# Patient Record
Sex: FEMALE | Race: WHITE | NOT HISPANIC OR LATINO
[De-identification: names, ages, dates, MRNs, and addresses within clinical notes are randomized per-mention and may not be internally consistent; named-entity substitution may affect disease eponyms.]

---

## 2017-05-26 ENCOUNTER — APPOINTMENT (OUTPATIENT)
Dept: ENDOCRINOLOGY | Facility: CLINIC | Age: 77
End: 2017-05-26

## 2017-05-26 VITALS — HEIGHT: 63 IN | WEIGHT: 184 LBS | BODY MASS INDEX: 32.6 KG/M2

## 2017-05-26 VITALS — SYSTOLIC BLOOD PRESSURE: 160 MMHG | OXYGEN SATURATION: 98 % | HEART RATE: 90 BPM | DIASTOLIC BLOOD PRESSURE: 72 MMHG

## 2017-05-26 RX ORDER — VERAPAMIL HYDROCHLORIDE 240 MG/1
240 CAPSULE, DELAYED RELEASE PELLETS ORAL
Refills: 0 | Status: ACTIVE | COMMUNITY

## 2017-05-30 LAB
25(OH)D3 SERPL-MCNC: 24 NG/ML
ALBUMIN SERPL ELPH-MCNC: 4.2 G/DL
ALP BLD-CCNC: 100 U/L
ALT SERPL-CCNC: 20 U/L
ANION GAP SERPL CALC-SCNC: 16 MMOL/L
AST SERPL-CCNC: 22 U/L
BILIRUB SERPL-MCNC: 0.2 MG/DL
BUN SERPL-MCNC: 16 MG/DL
CALCIUM SERPL-MCNC: 10.5 MG/DL
CALCIUM SERPL-MCNC: 10.6 MG/DL
CHLORIDE SERPL-SCNC: 106 MMOL/L
CO2 SERPL-SCNC: 22 MMOL/L
CREAT SERPL-MCNC: 0.8 MG/DL
GLUCOSE SERPL-MCNC: 86 MG/DL
MAGNESIUM SERPL-MCNC: 2.1 MG/DL
PARATHYROID HORMONE INTACT: 214 PG/ML
PHOSPHATE SERPL-MCNC: 3.6 MG/DL
POTASSIUM SERPL-SCNC: 5 MMOL/L
PROT SERPL-MCNC: 6.9 G/DL
SODIUM SERPL-SCNC: 144 MMOL/L
TSH SERPL-ACNC: 1.5 UIU/ML

## 2017-12-19 ENCOUNTER — APPOINTMENT (OUTPATIENT)
Dept: ENDOCRINOLOGY | Facility: CLINIC | Age: 77
End: 2017-12-19
Payer: MEDICARE

## 2017-12-19 VITALS
HEIGHT: 63 IN | SYSTOLIC BLOOD PRESSURE: 152 MMHG | WEIGHT: 181 LBS | BODY MASS INDEX: 32.07 KG/M2 | HEART RATE: 92 BPM | DIASTOLIC BLOOD PRESSURE: 92 MMHG | OXYGEN SATURATION: 98 %

## 2017-12-19 PROCEDURE — 99214 OFFICE O/P EST MOD 30 MIN: CPT | Mod: 25

## 2017-12-19 RX ORDER — VALSARTAN 160 MG/1
160 TABLET, COATED ORAL
Qty: 180 | Refills: 0 | Status: COMPLETED | COMMUNITY
Start: 2017-08-10

## 2017-12-19 RX ORDER — LEVOFLOXACIN 250 MG/1
250 TABLET, FILM COATED ORAL
Qty: 2 | Refills: 0 | Status: COMPLETED | COMMUNITY
Start: 2017-09-14

## 2017-12-19 RX ORDER — VERAPAMIL HYDROCHLORIDE 120 MG/1
120 TABLET ORAL
Qty: 90 | Refills: 0 | Status: COMPLETED | COMMUNITY
Start: 2017-06-30

## 2017-12-19 RX ORDER — UMECLIDINIUM BROMIDE AND VILANTEROL TRIFENATATE 62.5; 25 UG/1; UG/1
62.5-25 POWDER RESPIRATORY (INHALATION)
Qty: 60 | Refills: 0 | Status: COMPLETED | COMMUNITY
Start: 2017-01-18

## 2017-12-19 RX ORDER — ALPRAZOLAM 0.25 MG/1
0.25 TABLET ORAL
Qty: 10 | Refills: 0 | Status: COMPLETED | COMMUNITY
Start: 2017-09-08

## 2017-12-19 RX ORDER — METOPROLOL SUCCINATE 50 MG/1
50 TABLET, EXTENDED RELEASE ORAL
Qty: 45 | Refills: 0 | Status: COMPLETED | COMMUNITY
Start: 2017-10-25

## 2017-12-19 RX ORDER — LEVOCETIRIZINE DIHYDROCHLORIDE 5 MG/1
5 TABLET ORAL
Qty: 30 | Refills: 0 | Status: COMPLETED | COMMUNITY
Start: 2017-04-28

## 2017-12-20 LAB
24R-OH-CALCIDIOL SERPL-MCNC: 95.6 PG/ML
25(OH)D3 SERPL-MCNC: 32.6 NG/ML
ALBUMIN SERPL ELPH-MCNC: 4.4 G/DL
ALP BLD-CCNC: 117 U/L
ALT SERPL-CCNC: 30 U/L
ANION GAP SERPL CALC-SCNC: 11 MMOL/L
AST SERPL-CCNC: 29 U/L
BILIRUB SERPL-MCNC: 0.3 MG/DL
BUN SERPL-MCNC: 15 MG/DL
CALCIUM SERPL-MCNC: 12.1 MG/DL
CALCIUM SERPL-MCNC: 12.1 MG/DL
CHLORIDE SERPL-SCNC: 102 MMOL/L
CO2 SERPL-SCNC: 25 MMOL/L
CREAT SERPL-MCNC: 0.76 MG/DL
GLUCOSE SERPL-MCNC: 101 MG/DL
PARATHYROID HORMONE INTACT: 268 PG/ML
PHOSPHATE SERPL-MCNC: 2.4 MG/DL
POTASSIUM SERPL-SCNC: 4.8 MMOL/L
PROT SERPL-MCNC: 7 G/DL
SODIUM SERPL-SCNC: 138 MMOL/L

## 2018-06-19 ENCOUNTER — APPOINTMENT (OUTPATIENT)
Dept: ENDOCRINOLOGY | Facility: CLINIC | Age: 78
End: 2018-06-19
Payer: MEDICARE

## 2018-06-19 VITALS — HEIGHT: 63 IN | BODY MASS INDEX: 30.83 KG/M2 | WEIGHT: 174 LBS

## 2018-06-19 VITALS — HEART RATE: 92 BPM | OXYGEN SATURATION: 95 % | SYSTOLIC BLOOD PRESSURE: 140 MMHG | DIASTOLIC BLOOD PRESSURE: 100 MMHG

## 2018-06-19 DIAGNOSIS — E78.5 HYPERLIPIDEMIA, UNSPECIFIED: ICD-10-CM

## 2018-06-19 PROCEDURE — ZZZZZ: CPT

## 2018-06-19 PROCEDURE — 99214 OFFICE O/P EST MOD 30 MIN: CPT | Mod: 25

## 2018-06-19 PROCEDURE — 77080 DXA BONE DENSITY AXIAL: CPT | Mod: GA

## 2018-06-19 RX ORDER — PNV NO.95/FERROUS FUM/FOLIC AC 28MG-0.8MG
TABLET ORAL
Refills: 0 | Status: DISCONTINUED | COMMUNITY
End: 2018-06-19

## 2018-06-19 RX ORDER — RISEDRONATE SODIUM 150 MG/1
150 TABLET, FILM COATED ORAL
Qty: 3 | Refills: 3 | Status: DISCONTINUED | COMMUNITY
Start: 2017-05-26 | End: 2018-06-19

## 2018-06-19 RX ORDER — BIOTIN 10 MG
TABLET ORAL
Refills: 0 | Status: DISCONTINUED | COMMUNITY
End: 2018-06-19

## 2018-06-20 LAB
24R-OH-CALCIDIOL SERPL-MCNC: 77.9 PG/ML
25(OH)D3 SERPL-MCNC: 24.2 NG/ML
ALBUMIN SERPL ELPH-MCNC: 4.5 G/DL
ALP BLD-CCNC: 105 U/L
ALT SERPL-CCNC: 19 U/L
ANION GAP SERPL CALC-SCNC: 14 MMOL/L
AST SERPL-CCNC: 23 U/L
BILIRUB SERPL-MCNC: 0.3 MG/DL
BUN SERPL-MCNC: 15 MG/DL
CALCIUM SERPL-MCNC: 11.3 MG/DL
CALCIUM SERPL-MCNC: 11.3 MG/DL
CHLORIDE SERPL-SCNC: 104 MMOL/L
CHOLEST SERPL-MCNC: 177 MG/DL
CHOLEST/HDLC SERPL: 2.3 RATIO
CO2 SERPL-SCNC: 24 MMOL/L
CREAT SERPL-MCNC: 0.74 MG/DL
GLUCOSE SERPL-MCNC: 113 MG/DL
HDLC SERPL-MCNC: 76 MG/DL
LDLC SERPL CALC-MCNC: 82 MG/DL
PARATHYROID HORMONE INTACT: 242 PG/ML
PHOSPHATE SERPL-MCNC: 2.6 MG/DL
POTASSIUM SERPL-SCNC: 5.4 MMOL/L
PROT SERPL-MCNC: 7 G/DL
SODIUM SERPL-SCNC: 142 MMOL/L
TRIGL SERPL-MCNC: 94 MG/DL

## 2018-06-21 PROBLEM — E78.5 HYPERLIPIDEMIA: Status: ACTIVE | Noted: 2018-06-19

## 2019-04-08 ENCOUNTER — APPOINTMENT (OUTPATIENT)
Dept: ENDOCRINOLOGY | Facility: CLINIC | Age: 79
End: 2019-04-08
Payer: MEDICARE

## 2019-04-08 VITALS
OXYGEN SATURATION: 94 % | SYSTOLIC BLOOD PRESSURE: 164 MMHG | HEIGHT: 63 IN | HEART RATE: 85 BPM | BODY MASS INDEX: 33.49 KG/M2 | WEIGHT: 189 LBS | DIASTOLIC BLOOD PRESSURE: 84 MMHG

## 2019-04-08 DIAGNOSIS — E83.52 HYPERCALCEMIA: ICD-10-CM

## 2019-04-08 PROCEDURE — 99214 OFFICE O/P EST MOD 30 MIN: CPT

## 2019-04-08 NOTE — PHYSICAL EXAM
[Alert] : alert [No Acute Distress] : no acute distress [Well Nourished] : well nourished [Well Developed] : well developed [Normal Sclera/Conjunctiva] : normal sclera/conjunctiva [No Proptosis] : no proptosis [Normal Oropharynx] : the oropharynx was normal [Thyroid Not Enlarged] : the thyroid was not enlarged [No Thyroid Nodules] : there were no palpable thyroid nodules [No Respiratory Distress] : no respiratory distress [No Accessory Muscle Use] : no accessory muscle use [Clear to Auscultation] : lungs were clear to auscultation bilaterally [Normal Rate] : heart rate was normal  [Normal S1, S2] : normal S1 and S2 [Regular Rhythm] : with a regular rhythm [Pedal Pulses Normal] : the pedal pulses are present [No Edema] : there was no peripheral edema [Normal Bowel Sounds] : normal bowel sounds [Not Tender] : non-tender [Soft] : abdomen soft [Not Distended] : not distended [Post Cervical Nodes] : posterior cervical nodes [Anterior Cervical Nodes] : anterior cervical nodes [Axillary Nodes] : axillary nodes [Normal] : normal and non tender [No Spinal Tenderness] : no spinal tenderness [Spine Straight] : spine straight [No Stigmata of Cushings Syndrome] : no stigmata of cushings syndrome [Normal Gait] : normal gait [Normal Strength/Tone] : muscle strength and tone were normal [No Rash] : no rash [Normal Reflexes] : deep tendon reflexes were 2+ and symmetric [No Motor Deficits] : the motor exam was normal [No Tremors] : no tremors [Oriented x3] : oriented to person, place, and time [Normal Affect] : the affect was normal [Normal Mood] : the mood was normal [Scoliosis] : scoliosis present

## 2019-04-08 NOTE — HISTORY OF PRESENT ILLNESS
[Risedronate (Actonel)] : Risedronate [Vitamin D (supplements)] : Vitamin D as a dietary supplement [Hyperparathyroidism] : hyperparathyroidism [FreeTextEntry1] : f/u 78 year-old female with primary hyperparathyroidism. \par \par Dx ~2013. Her initial presentation was remarkable for Ca levels >10.5 with PTH  207. She has had no kidney stones or fx.  BMD May 2015 compatible with osteopenia. Repeat calcium was 10.5 with  in May 2015. BMD 5/2017  decreased hip but increased radius. Began Actonel but stopped due to UGI sx. BMD 2018 decreased in proximal radius.  No new drugs such as lithium or HCTZ. She has no fhx of Ca disorders. No fractures or falls. Pt denying osteoporosis rx or parathyroid surgery consult. \par \par Dx with 70% carotid artery stenosis and was placed on lipitor by her PCP. \par \par Routine cystoscopy because she previously had a polyp. No compliations.  [Disordered Eating] : no past or present history of disordered eating [Taking Steroids] : no past or present history of taking steroids [Kidney Stones] : no history of kidney stones [Excessive Alcohol Intake] : no past or present history of excessive alcohol intake [Family History of Osteoporosis] : no family history of osteoporosis [Family History of Hip Fracture] : no family history of hip fracture [de-identified] : Previous smoker quit 12 years ago

## 2019-04-08 NOTE — REVIEW OF SYSTEMS
[Diarrhea] : diarrhea [Joint Stiffness] : joint stiffness [Negative] : Heme/Lymph [FreeTextEntry5] : HLD, HTN [FreeTextEntry7] : GI evaluation for diarrhea has been unremarkable [FreeTextEntry9] : Arthritic pain typically in the morning in bilat knees and hands that resolves as the day progresses

## 2019-04-08 NOTE — END OF VISIT
[FreeTextEntry3] : I, Ivory Rizvi, authored this note working as a medical scribe for Dr. Kaba.  04/08/2019. 11:15AM. \par This note was authored by Ivory Rizvi working as medical scribe for me. I have reviewed, edited, and revised the note as needed. I am in agreement with the exam findings, imaging findings, and treatment plan.  Boaz Kaba MD

## 2019-04-08 NOTE — ASSESSMENT
[FreeTextEntry1] : 78 year-old female with osteopenia in the setting of PHPT. \par \par Pt remains asymptomatic; no h/o fx or kidney stones.Prior Ca was 12.1 recommended surgical consult, did not pursue. BMD 2017 showed decrease in hips.  Pt tried  Actonel but stopped due to UGI sx. BMD 2018 decreased further in proximal radius. Sites still consistent with osteopenia. Options discussed in great detail, jocelyn Reclast or Prolia. Pt states she was told by Dr. Howard Muñiz to not start osteoporosis rx. Pt chose not to start other osteoporosis rx. Option of surgery vs monitoring for PHPT again reviewed. Pt not planning on surgery. \par \par Pt states Ca was ~ 11 on last labs. I request labs sent out today to include PTH and Vit D. \par \par f/u in 6 mons. \par Jeanmarie ANTONIETA, Cassy ML, Bruno R, et al. Guidelines for the management of asymptomatic primary hyperparathyroidism: summary statement from the [employer] Workshop. J Clin Endocrinol Metab 2014; 99:3561

## 2019-04-09 LAB
24R-OH-CALCIDIOL SERPL-MCNC: 90.4 PG/ML
25(OH)D3 SERPL-MCNC: 24.5 NG/ML
ALBUMIN SERPL ELPH-MCNC: 4.3 G/DL
ALP BLD-CCNC: 101 U/L
ALT SERPL-CCNC: 14 U/L
ANION GAP SERPL CALC-SCNC: 10 MMOL/L
AST SERPL-CCNC: 18 U/L
BILIRUB SERPL-MCNC: 0.2 MG/DL
BUN SERPL-MCNC: 17 MG/DL
CALCIUM SERPL-MCNC: 11.4 MG/DL
CALCIUM SERPL-MCNC: 11.4 MG/DL
CHLORIDE SERPL-SCNC: 106 MMOL/L
CO2 SERPL-SCNC: 23 MMOL/L
CREAT SERPL-MCNC: 0.69 MG/DL
GLUCOSE SERPL-MCNC: 84 MG/DL
MAGNESIUM SERPL-MCNC: 2 MG/DL
PARATHYROID HORMONE INTACT: 234 PG/ML
PHOSPHATE SERPL-MCNC: 2.4 MG/DL
POTASSIUM SERPL-SCNC: 4.7 MMOL/L
PROT SERPL-MCNC: 6.9 G/DL
SODIUM SERPL-SCNC: 139 MMOL/L

## 2019-04-17 ENCOUNTER — APPOINTMENT (OUTPATIENT)
Dept: SURGERY | Facility: CLINIC | Age: 79
End: 2019-04-17

## 2019-08-28 ENCOUNTER — APPOINTMENT (OUTPATIENT)
Dept: SURGICAL ONCOLOGY | Facility: CLINIC | Age: 79
End: 2019-08-28
Payer: MEDICARE

## 2019-08-28 VITALS
HEART RATE: 77 BPM | HEIGHT: 63 IN | WEIGHT: 189 LBS | SYSTOLIC BLOOD PRESSURE: 174 MMHG | DIASTOLIC BLOOD PRESSURE: 83 MMHG | BODY MASS INDEX: 33.49 KG/M2 | OXYGEN SATURATION: 97 %

## 2019-08-28 DIAGNOSIS — R06.9 UNSPECIFIED ABNORMALITIES OF BREATHING: ICD-10-CM

## 2019-08-28 DIAGNOSIS — I51.9 HEART DISEASE, UNSPECIFIED: ICD-10-CM

## 2019-08-28 DIAGNOSIS — Z86.79 PERSONAL HISTORY OF OTHER DISEASES OF THE CIRCULATORY SYSTEM: ICD-10-CM

## 2019-08-28 DIAGNOSIS — Z86.39 PERSONAL HISTORY OF OTHER ENDOCRINE, NUTRITIONAL AND METABOLIC DISEASE: ICD-10-CM

## 2019-08-28 DIAGNOSIS — Z87.09 PERSONAL HISTORY OF OTHER DISEASES OF THE RESPIRATORY SYSTEM: ICD-10-CM

## 2019-08-28 PROCEDURE — 99205 OFFICE O/P NEW HI 60 MIN: CPT

## 2019-08-28 NOTE — HISTORY OF PRESENT ILLNESS
[de-identified] : 79 y/o female patient presents for initial consultation for right buttock abscess she has noticed since April.\par She reports that she has had a bump there on her right buttock for fifty years.\par She has one day left of current one week course of Doxycycline. \par \par Right buttock culture: Moderate growth of Staphylococcus aureus (8/19/19).\par \par Allergic to Bactrim.\par Denies constitutional symptoms. \par Denies fever or chills.

## 2019-08-28 NOTE — PHYSICAL EXAM
[Normal] : supple, no neck mass and thyroid not enlarged [Normal Neck Lymph Nodes] : normal neck lymph nodes  [Normal Supraclavicular Lymph Nodes] : normal supraclavicular lymph nodes [Normal Groin Lymph Nodes] : normal groin lymph nodes [Normal Axillary Lymph Nodes] : normal axillary lymph nodes [Normal] : grossly intact [de-identified] : 6x8cm right lateral buttock mass with overlying skin discoloration draining purulent/keratinaceous fluid from two sites. No significant skin erythema.

## 2019-08-28 NOTE — OB HISTORY
[Menarche Age ____] : menarche age [unfilled] [Postmenopausal] : The patient is postmenopausal [unknown] : the patient is unsure of the date of her LMP [Live Births ___] : P[unfilled]  [Total Preg ___] : G[unfilled]

## 2019-08-28 NOTE — ADDENDUM
[FreeTextEntry1] : I, Zofia Bass, acted solely as a scribe for Dr. Vu Fontana on this date 08/28/2019.\par

## 2019-08-28 NOTE — CONSULT LETTER
[Dear  ___] : Dear  [unfilled], [Consult Letter:] : I had the pleasure of evaluating your patient, [unfilled]. [Please see my note below.] : Please see my note below. [Sincerely,] : Sincerely, [FreeTextEntry3] : Vu Fontana MD FACS\par

## 2019-08-28 NOTE — ASSESSMENT
[FreeTextEntry1] : Suspect right lateral buttock sebaceous cyst.\par Likely infected\par No obvious fluctuant abscess that needs immediate drainage\par Will continue oral abx\par Will schedule for elective surgical resection\par Procedure discussed in detail.\par All questions answered.

## 2019-08-29 RX ORDER — DOXYCYCLINE HYCLATE 100 MG/1
100 TABLET ORAL
Qty: 28 | Refills: 0 | Status: ACTIVE | COMMUNITY
Start: 2019-08-29 | End: 1900-01-01

## 2019-09-03 ENCOUNTER — RX RENEWAL (OUTPATIENT)
Age: 79
End: 2019-09-03

## 2019-09-05 ENCOUNTER — OUTPATIENT (OUTPATIENT)
Dept: OUTPATIENT SERVICES | Facility: HOSPITAL | Age: 79
LOS: 1 days | End: 2019-09-05
Payer: MEDICARE

## 2019-09-05 VITALS
TEMPERATURE: 99 F | RESPIRATION RATE: 16 BRPM | SYSTOLIC BLOOD PRESSURE: 140 MMHG | HEIGHT: 63 IN | OXYGEN SATURATION: 98 % | WEIGHT: 182.98 LBS | HEART RATE: 76 BPM | DIASTOLIC BLOOD PRESSURE: 74 MMHG

## 2019-09-05 DIAGNOSIS — D41.4 NEOPLASM OF UNCERTAIN BEHAVIOR OF BLADDER: Chronic | ICD-10-CM

## 2019-09-05 DIAGNOSIS — L02.31 CUTANEOUS ABSCESS OF BUTTOCK: ICD-10-CM

## 2019-09-05 DIAGNOSIS — Z98.890 OTHER SPECIFIED POSTPROCEDURAL STATES: Chronic | ICD-10-CM

## 2019-09-05 LAB
ANION GAP SERPL CALC-SCNC: 10 MMO/L — SIGNIFICANT CHANGE UP (ref 7–14)
BASOPHILS # BLD AUTO: 0.06 K/UL — SIGNIFICANT CHANGE UP (ref 0–0.2)
BASOPHILS NFR BLD AUTO: 0.8 % — SIGNIFICANT CHANGE UP (ref 0–2)
BLD GP AB SCN SERPL QL: NEGATIVE — SIGNIFICANT CHANGE UP
BUN SERPL-MCNC: 13 MG/DL — SIGNIFICANT CHANGE UP (ref 7–23)
CALCIUM SERPL-MCNC: 11.8 MG/DL — HIGH (ref 8.4–10.5)
CHLORIDE SERPL-SCNC: 104 MMOL/L — SIGNIFICANT CHANGE UP (ref 98–107)
CO2 SERPL-SCNC: 25 MMOL/L — SIGNIFICANT CHANGE UP (ref 22–31)
CREAT SERPL-MCNC: 0.69 MG/DL — SIGNIFICANT CHANGE UP (ref 0.5–1.3)
EOSINOPHIL # BLD AUTO: 0.22 K/UL — SIGNIFICANT CHANGE UP (ref 0–0.5)
EOSINOPHIL NFR BLD AUTO: 3.1 % — SIGNIFICANT CHANGE UP (ref 0–6)
GLUCOSE SERPL-MCNC: 78 MG/DL — SIGNIFICANT CHANGE UP (ref 70–99)
HCT VFR BLD CALC: 38 % — SIGNIFICANT CHANGE UP (ref 34.5–45)
HGB BLD-MCNC: 11.6 G/DL — SIGNIFICANT CHANGE UP (ref 11.5–15.5)
IMM GRANULOCYTES NFR BLD AUTO: 0.3 % — SIGNIFICANT CHANGE UP (ref 0–1.5)
LYMPHOCYTES # BLD AUTO: 1.46 K/UL — SIGNIFICANT CHANGE UP (ref 1–3.3)
LYMPHOCYTES # BLD AUTO: 20.2 % — SIGNIFICANT CHANGE UP (ref 13–44)
MCHC RBC-ENTMCNC: 28.2 PG — SIGNIFICANT CHANGE UP (ref 27–34)
MCHC RBC-ENTMCNC: 30.5 % — LOW (ref 32–36)
MCV RBC AUTO: 92.2 FL — SIGNIFICANT CHANGE UP (ref 80–100)
MONOCYTES # BLD AUTO: 0.79 K/UL — SIGNIFICANT CHANGE UP (ref 0–0.9)
MONOCYTES NFR BLD AUTO: 11 % — SIGNIFICANT CHANGE UP (ref 2–14)
NEUTROPHILS # BLD AUTO: 4.66 K/UL — SIGNIFICANT CHANGE UP (ref 1.8–7.4)
NEUTROPHILS NFR BLD AUTO: 64.6 % — SIGNIFICANT CHANGE UP (ref 43–77)
NRBC # FLD: 0 K/UL — SIGNIFICANT CHANGE UP (ref 0–0)
PLATELET # BLD AUTO: 267 K/UL — SIGNIFICANT CHANGE UP (ref 150–400)
PMV BLD: 10.4 FL — SIGNIFICANT CHANGE UP (ref 7–13)
POTASSIUM SERPL-MCNC: 4.6 MMOL/L — SIGNIFICANT CHANGE UP (ref 3.5–5.3)
POTASSIUM SERPL-SCNC: 4.6 MMOL/L — SIGNIFICANT CHANGE UP (ref 3.5–5.3)
RBC # BLD: 4.12 M/UL — SIGNIFICANT CHANGE UP (ref 3.8–5.2)
RBC # FLD: 13.4 % — SIGNIFICANT CHANGE UP (ref 10.3–14.5)
RH IG SCN BLD-IMP: POSITIVE — SIGNIFICANT CHANGE UP
SODIUM SERPL-SCNC: 139 MMOL/L — SIGNIFICANT CHANGE UP (ref 135–145)
WBC # BLD: 7.21 K/UL — SIGNIFICANT CHANGE UP (ref 3.8–10.5)
WBC # FLD AUTO: 7.21 K/UL — SIGNIFICANT CHANGE UP (ref 3.8–10.5)

## 2019-09-05 PROCEDURE — 93010 ELECTROCARDIOGRAM REPORT: CPT

## 2019-09-05 NOTE — H&P PST ADULT - RS GEN PE MLT RESP DETAILS PC
breath sounds equal/airway patent/no wheezes/clear to auscultation bilaterally/good air movement/no rales/respirations non-labored/no rhonchi

## 2019-09-05 NOTE — H&P PST ADULT - NEGATIVE ENMT SYMPTOMS
no ear pain/no nasal obstruction/no dry mouth/no tinnitus/no hearing difficulty/no throat pain/no dysphagia/no vertigo

## 2019-09-05 NOTE — H&P PST ADULT - NSICDXPASTMEDICALHX_GEN_ALL_CORE_FT
PAST MEDICAL HISTORY:  Abscess of right buttock     Arthritis spine and knee    Asthma with COPD emphysema    High cholesterol     HTN (hypertension)     Spinal stenosis PAST MEDICAL HISTORY:  Abscess of right buttock     Arthritis spine and knee    Asthma with COPD emphysema    Exertional dyspnea     H/O carotid stenosis <60%    H/O hyperparathyroidism     H/O pulmonary hypertension     High cholesterol     History of aortic insufficiency     HTN (hypertension)     Obesity     Spinal stenosis

## 2019-09-05 NOTE — H&P PST ADULT - NSICDXPROBLEM_GEN_ALL_CORE_FT
Problem: cutaneous abscess of buttock   Assessment and Plan: Patient scheduled for resection right buttock mass on 09/09/19    Patient provided with verbal and written presurgical instructions; verbalized understanding  with teach back.    Patient provided with famotidine for GI prophylaxis; verbalized understanding.    Patient provided with Chlorhexidine wash, verbal and written instructions reviewed. Patient demonstrated understanding with teach back.     OR booking notified of shellfish   Medical and Cardiac evaluation requested by surgeon and PST , for advanced COPD with pulmonary HTN and dyspnea, patient visited PCP/ Cardiologist  8/27/19, will obtain.   Recent stress and echo requested    Problem: Hypertension  Assessment and Plan: Patient instructed to take valsartan and verapamil on day of procedure, verbalized understanding.    Problem: COPD  Assessment and Plan: Patient instructed to take Xopenex trelegy,  on day of procedure, verbalized understanding. Problem: cutaneous abscess of buttock   Assessment and Plan: Patient scheduled for resection right buttock mass on 09/09/19    Patient provided with verbal and written presurgical instructions; verbalized understanding  with teach back.    Patient provided with famotidine for GI prophylaxis; verbalized understanding.    Patient provided with Chlorhexidine wash, verbal and written instructions reviewed. Patient demonstrated understanding with teach back.     OR booking notified of shellfish   Medical evaluation for advanced COPD and Cardiac evaluation requested by surgeon and PST for pulmonary HTN and dyspnea, will obtain.   Recent stress and echo requested    Problem: Hypertension  Assessment and Plan: Patient instructed to take valsartan and verapamil on day of procedure, verbalized understanding.    Problem: COPD  Assessment and Plan: Patient instructed to take Xopenex trelegy,  on day of procedure, verbalized understanding.

## 2019-09-05 NOTE — H&P PST ADULT - ASSESSMENT
cutaneous abscess of buttock scheduled for resection right buttock mass on 09/09/19 DX: cutaneous abscess of buttock

## 2019-09-05 NOTE — H&P PST ADULT - HISTORY OF PRESENT ILLNESS
78 year old female presents with recurrent abscess to right buttocks, reports multiple incision and drainage; last I&D 8/19/19 states it's still "draining and new bump developed". Presents to PST with preop diagnosis of cutaneous abscess of buttock scheduled for resection right buttock mass on 09/09/19. Patient states she stopped prescribed antibiotics (doxycycline) due to stomach upset and refused to take Biaxin prescribed. 78 year old female presents with recurrent abscess to right buttocks, reports multiple incision and drainage; last I&D 8/19/19 states it's still "draining and new bump developed". Presents to PST with preop diagnosis of cutaneous abscess of buttock scheduled for resection right buttock mass on 09/09/19. 78 year old female presents with recurrent abscess to right buttocks, reports multiple incision and drainage; last I&D 8/19/19 states it's still "draining and new bump developed", culture indicated Staphylococcus aureous, patient treated with doxycycline for 1 week. Presents to PST with preop diagnosis of cutaneous abscess of buttock scheduled for resection right buttock mass on 09/09/19.

## 2019-09-08 ENCOUNTER — TRANSCRIPTION ENCOUNTER (OUTPATIENT)
Age: 79
End: 2019-09-08

## 2019-09-09 ENCOUNTER — RESULT REVIEW (OUTPATIENT)
Age: 79
End: 2019-09-09

## 2019-09-09 ENCOUNTER — APPOINTMENT (OUTPATIENT)
Dept: SURGICAL ONCOLOGY | Facility: AMBULATORY SURGERY CENTER | Age: 79
End: 2019-09-09

## 2019-09-09 ENCOUNTER — OUTPATIENT (OUTPATIENT)
Dept: OUTPATIENT SERVICES | Facility: HOSPITAL | Age: 79
LOS: 1 days | Discharge: HOME | End: 2019-09-09
Payer: MEDICARE

## 2019-09-09 VITALS
RESPIRATION RATE: 16 BRPM | HEIGHT: 63 IN | TEMPERATURE: 98 F | SYSTOLIC BLOOD PRESSURE: 178 MMHG | OXYGEN SATURATION: 100 % | HEART RATE: 76 BPM | DIASTOLIC BLOOD PRESSURE: 58 MMHG | WEIGHT: 182.98 LBS

## 2019-09-09 VITALS
HEART RATE: 81 BPM | OXYGEN SATURATION: 100 % | DIASTOLIC BLOOD PRESSURE: 88 MMHG | RESPIRATION RATE: 15 BRPM | SYSTOLIC BLOOD PRESSURE: 128 MMHG

## 2019-09-09 DIAGNOSIS — D41.4 NEOPLASM OF UNCERTAIN BEHAVIOR OF BLADDER: Chronic | ICD-10-CM

## 2019-09-09 DIAGNOSIS — L02.31 CUTANEOUS ABSCESS OF BUTTOCK: ICD-10-CM

## 2019-09-09 DIAGNOSIS — Z98.890 OTHER SPECIFIED POSTPROCEDURAL STATES: Chronic | ICD-10-CM

## 2019-09-09 PROCEDURE — 14001 TIS TRNFR TRUNK 10.1-30SQCM: CPT | Mod: 59

## 2019-09-09 PROCEDURE — 88305 TISSUE EXAM BY PATHOLOGIST: CPT | Mod: 26

## 2019-09-09 PROCEDURE — 27059 RESECT HIP/PELV TUM 5 CM/>: CPT

## 2019-09-09 RX ORDER — ATORVASTATIN CALCIUM 80 MG/1
1 TABLET, FILM COATED ORAL
Qty: 0 | Refills: 0 | DISCHARGE

## 2019-09-09 RX ORDER — FLUTICASONE FUROATE, UMECLIDINIUM BROMIDE AND VILANTEROL TRIFENATATE 200; 62.5; 25 UG/1; UG/1; UG/1
1 POWDER RESPIRATORY (INHALATION)
Qty: 0 | Refills: 0 | DISCHARGE

## 2019-09-09 RX ORDER — VERAPAMIL HCL 240 MG
1 CAPSULE, EXTENDED RELEASE PELLETS 24 HR ORAL
Qty: 0 | Refills: 0 | DISCHARGE

## 2019-09-09 RX ORDER — VALSARTAN 80 MG/1
1 TABLET ORAL
Qty: 0 | Refills: 0 | DISCHARGE

## 2019-09-09 RX ORDER — FEXOFENADINE HCL 30 MG
1 TABLET ORAL
Qty: 0 | Refills: 0 | DISCHARGE

## 2019-09-09 RX ORDER — LEVALBUTEROL 1.25 MG/.5ML
3 SOLUTION, CONCENTRATE RESPIRATORY (INHALATION)
Qty: 0 | Refills: 0 | DISCHARGE

## 2019-09-09 RX ORDER — OXYCODONE HYDROCHLORIDE 5 MG/1
1 TABLET ORAL
Qty: 10 | Refills: 0
Start: 2019-09-09

## 2019-09-09 RX ORDER — ALUMINUM ZIRCONIUM TRICHLOROHYDREX GLY 0.2 G/G
1 STICK TOPICAL
Qty: 0 | Refills: 0 | DISCHARGE

## 2019-09-09 NOTE — ASU DISCHARGE PLAN (ADULT/PEDIATRIC) - CALL YOUR DOCTOR IF YOU HAVE ANY OF THE FOLLOWING:
Swelling that gets worse/Bleeding that does not stop/Wound/Surgical Site with redness, or foul smelling discharge or pus/Pain not relieved by Medications/Fever greater than (need to indicate Fahrenheit or Celsius) declines

## 2019-09-09 NOTE — ASU DISCHARGE PLAN (ADULT/PEDIATRIC) - CARE PROVIDER_API CALL
Vu Fontana)  Surgery  [address] [place] [address]  Phone: [phone]  Fax: [phone]  Follow Up Time: 2 weeks

## 2019-09-09 NOTE — ASU DISCHARGE PLAN (ADULT/PEDIATRIC) - ASU DC SPECIAL INSTRUCTIONSFT
WOUND CARE: Keep incision clean and dry.  Cover with dry, sterile dressing.  You may remove the top dressing 2 days after surgery. Underneath the top dressing there are white bandages (Steri Strips) directly adherent to your skin. Do not remove these Steri Strips; they will fall off on their own over the next week.   BATHING: Please do not submerge wound underwater. You may shower and/or sponge bathe.  ACTIVITY: No heavy lifting or straining. Otherwise, you may return to your usual level of physical activity. If you are taking narcotic pain medication (such as Percocet), do NOT drive a car, operate machinery or make important decisions.  NOTIFY YOUR SURGEON IF: You have any bleeding that does not stop, any pus draining from your wound, any fever (over 100.4 F) or chills, persistent nausea/vomiting, persistent diarrhea, or if your pain is not controlled on your discharge pain medications.  FOLLOW-UP:  1. Follow-up with Dr. Fontana within 1-2 weeks of discharge.  Please call office for appointment  2. Please follow up with your primary care physician in one week regarding your surgery.

## 2019-09-10 PROBLEM — Z86.79 PERSONAL HISTORY OF OTHER DISEASES OF THE CIRCULATORY SYSTEM: Chronic | Status: ACTIVE | Noted: 2019-09-05

## 2019-09-10 PROBLEM — E78.00 PURE HYPERCHOLESTEROLEMIA, UNSPECIFIED: Chronic | Status: ACTIVE | Noted: 2019-09-05

## 2019-09-10 PROBLEM — Z86.39 PERSONAL HISTORY OF OTHER ENDOCRINE, NUTRITIONAL AND METABOLIC DISEASE: Chronic | Status: ACTIVE | Noted: 2019-09-05

## 2019-09-10 PROBLEM — I10 ESSENTIAL (PRIMARY) HYPERTENSION: Chronic | Status: ACTIVE | Noted: 2019-09-05

## 2019-09-10 PROBLEM — M19.90 UNSPECIFIED OSTEOARTHRITIS, UNSPECIFIED SITE: Chronic | Status: ACTIVE | Noted: 2019-09-05

## 2019-09-10 PROBLEM — M48.00 SPINAL STENOSIS, SITE UNSPECIFIED: Chronic | Status: ACTIVE | Noted: 2019-09-05

## 2019-09-10 PROBLEM — R06.09 OTHER FORMS OF DYSPNEA: Chronic | Status: ACTIVE | Noted: 2019-09-05

## 2019-09-10 PROBLEM — J44.9 CHRONIC OBSTRUCTIVE PULMONARY DISEASE, UNSPECIFIED: Chronic | Status: ACTIVE | Noted: 2019-09-05

## 2019-09-10 PROBLEM — E66.9 OBESITY, UNSPECIFIED: Chronic | Status: ACTIVE | Noted: 2019-09-05

## 2019-09-10 PROBLEM — L02.31 CUTANEOUS ABSCESS OF BUTTOCK: Chronic | Status: ACTIVE | Noted: 2019-09-05

## 2019-09-25 ENCOUNTER — APPOINTMENT (OUTPATIENT)
Dept: SURGICAL ONCOLOGY | Facility: CLINIC | Age: 79
End: 2019-09-25
Payer: MEDICARE

## 2019-09-25 VITALS
HEIGHT: 63 IN | SYSTOLIC BLOOD PRESSURE: 200 MMHG | HEART RATE: 86 BPM | DIASTOLIC BLOOD PRESSURE: 98 MMHG | BODY MASS INDEX: 32.43 KG/M2 | OXYGEN SATURATION: 97 % | WEIGHT: 183 LBS

## 2019-09-25 DIAGNOSIS — L02.31 CUTANEOUS ABSCESS OF BUTTOCK: ICD-10-CM

## 2019-09-25 LAB — SURGICAL PATHOLOGY STUDY: SIGNIFICANT CHANGE UP

## 2019-09-25 PROCEDURE — 99024 POSTOP FOLLOW-UP VISIT: CPT

## 2019-09-25 NOTE — HISTORY OF PRESENT ILLNESS
[de-identified] : 78 y/o female patient presents for post-op follow-up s/p right lateral buttock mass resection on 9/9/19. Pathology: Squamous cell carcinoma, invasive, well differentiated. Tumor size: 4.5 x 3.9 x 2.9 cm (per gross description). Dermal fibrosis. The inked surgical margins appear clear of squamous cell carcinoma.\par \par She notes some soreness at surgical site.\par \par Right buttock culture: Moderate growth of Staphylococcus aureus (8/19/19).\par \par She has a right buttock abscess she has noticed since April.\par She reported that she has had a bump there on her right buttock for fifty years.\par She finished course of Doxycycline.\par \par Allergic to Bactrim.\par Denies constitutional symptoms. \par Denies fever or chills.

## 2019-09-25 NOTE — ASSESSMENT
[FreeTextEntry1] : S/p resection right buttock mass\par Final pathology revealed SCC\par Margins negative\par Normal post-operative course. \par RTO 3 months for physical examination\par \par Enclosed pathology report

## 2019-09-25 NOTE — CONSULT LETTER
[Dear  ___] : Dear  [unfilled], [Courtesy Letter:] : I had the pleasure of seeing your patient, [unfilled], in my office today. [Please see my note below.] : Please see my note below. [Sincerely,] : Sincerely, [FreeTextEntry3] : Vu Fontana MD FACS\par

## 2019-09-25 NOTE — PHYSICAL EXAM
----- Message from Shana Coleman MA sent at 4/22/2019  9:37 AM CDT -----  Contact: Anju @ [employer]      ----- Message -----  From: Ansley Olguin  Sent: 4/22/2019   9:06 AM  To: Ronak Christianson Staff    Anju would like a call back regarding the validity of an out of work letter regarding the pt    She would like a call back @ [phone] ext [phone]    Thank you   
Returned call and clarified that patient had gotten her letter early in anticipation of procedure on Wednesday and she turned it in early to her employer. They were just wanting to confirm the legitimacy of the letter.  
[de-identified] : Right buttock incision healing well. No evidence of infection.

## 2019-09-25 NOTE — OB HISTORY
[Postmenopausal] : The patient is postmenopausal [Menarche Age ____] : menarche age [unfilled] [unknown] : the patient is unsure of the date of her LMP [Total Preg ___] : G[unfilled] [Live Births ___] : P[unfilled]

## 2019-09-25 NOTE — ADDENDUM
[FreeTextEntry1] : I, Zofia Bass, acted solely as a scribe for Dr. Vu Fontana on this date 09/25/2019.

## 2019-10-04 ENCOUNTER — APPOINTMENT (OUTPATIENT)
Dept: ENDOCRINOLOGY | Facility: CLINIC | Age: 79
End: 2019-10-04

## 2019-10-18 ENCOUNTER — APPOINTMENT (OUTPATIENT)
Dept: ENDOCRINOLOGY | Facility: CLINIC | Age: 79
End: 2019-10-18
Payer: MEDICARE

## 2019-10-18 VITALS
BODY MASS INDEX: 32.6 KG/M2 | HEART RATE: 88 BPM | OXYGEN SATURATION: 98 % | WEIGHT: 184 LBS | HEIGHT: 63 IN | SYSTOLIC BLOOD PRESSURE: 144 MMHG | DIASTOLIC BLOOD PRESSURE: 80 MMHG

## 2019-10-18 DIAGNOSIS — M85.80 OTHER SPECIFIED DISORDERS OF BONE DENSITY AND STRUCTURE, UNSPECIFIED SITE: ICD-10-CM

## 2019-10-18 DIAGNOSIS — E21.0 PRIMARY HYPERPARATHYROIDISM: ICD-10-CM

## 2019-10-18 PROCEDURE — 77080 DXA BONE DENSITY AXIAL: CPT | Mod: GA

## 2019-10-18 PROCEDURE — 99214 OFFICE O/P EST MOD 30 MIN: CPT | Mod: 25

## 2019-10-18 PROCEDURE — ZZZZZ: CPT

## 2019-10-18 RX ORDER — FLUTICASONE FUROATE, UMECLIDINIUM BROMIDE AND VILANTEROL TRIFENATATE 200; 62.5; 25 UG/1; UG/1; UG/1
POWDER RESPIRATORY (INHALATION)
Refills: 0 | Status: ACTIVE | COMMUNITY

## 2019-10-18 RX ORDER — SULFAMETHOXAZOLE AND TRIMETHOPRIM 800; 160 MG/1; MG/1
800-160 TABLET ORAL
Qty: 20 | Refills: 0 | Status: DISCONTINUED | COMMUNITY
Start: 2019-09-03 | End: 2019-10-18

## 2019-10-18 RX ORDER — TIOTROPIUM BROMIDE AND OLODATEROL 3.124; 2.736 UG/1; UG/1
2.5-2.5 SPRAY, METERED RESPIRATORY (INHALATION)
Refills: 0 | Status: DISCONTINUED | COMMUNITY
End: 2019-10-18

## 2019-10-18 NOTE — HISTORY OF PRESENT ILLNESS
[Risedronate (Actonel)] : Risedronate [Vitamin D (supplements)] : Vitamin D as a dietary supplement [Hyperparathyroidism] : hyperparathyroidism [FreeTextEntry1] : f/u 79 year-old female with primary hyperparathyroidism. The patient was reluctant to consider parathyroid surgery in the past but is now planning on surgery with Dr. Nathan in 2020, waiting for son to have abdominal surgery.\par \par Dx ~2013. Her initial presentation was remarkable for Ca levels >10.5 with PTH  207. She has had no kidney stones or fx.  BMD May 2015 compatible with osteopenia. Repeat calcium was 10.5 with  in May 2015. BMD 5/2017  decreased hip but increased radius. Began Actonel but stopped due to UGI sx. BMD 2018 decreased in proximal radius.  No new drugs such as lithium or HCTZ. She has no fhx of Ca disorders. No fractures or falls. Pt denied osteoporosis rx because Dr. Muñiz said she does not meet the requirements to treat.\par \par Dx with 70% carotid artery stenosis and was placed on lipitor by her PCP. \par \par Pt had squamous cell carcinoma tumor removed 9/2019. Now resolved, clear margins. [Disordered Eating] : no past or present history of disordered eating [Taking Steroids] : no past or present history of taking steroids [Kidney Stones] : no history of kidney stones [Family History of Osteoporosis] : no family history of osteoporosis [Excessive Alcohol Intake] : no past or present history of excessive alcohol intake [Family History of Hip Fracture] : no family history of hip fracture [de-identified] : Previous smoker quit 12 years ago

## 2019-10-18 NOTE — END OF VISIT
[FreeTextEntry3] : I, Rm Baker, authored this note working as a medical scribe for Dr. Kaba.  10/18/2019. 11:30AM   This note was authored by the medical scribe for me. I have reviewed, edited, and revised the note as needed. I am in agreement with the exam findings, imaging findings, and treatment plan.  Boaz Kaba MD

## 2019-10-18 NOTE — PHYSICAL EXAM
[Alert] : alert [No Acute Distress] : no acute distress [Well Nourished] : well nourished [Well Developed] : well developed [Normal Sclera/Conjunctiva] : normal sclera/conjunctiva [No Proptosis] : no proptosis [No Thyroid Nodules] : there were no palpable thyroid nodules [Normal Oropharynx] : the oropharynx was normal [Thyroid Not Enlarged] : the thyroid was not enlarged [Clear to Auscultation] : lungs were clear to auscultation bilaterally [No Accessory Muscle Use] : no accessory muscle use [No Respiratory Distress] : no respiratory distress [Normal S1, S2] : normal S1 and S2 [Normal Rate] : heart rate was normal  [No Edema] : there was no peripheral edema [Pedal Pulses Normal] : the pedal pulses are present [Regular Rhythm] : with a regular rhythm [Not Tender] : non-tender [Normal Bowel Sounds] : normal bowel sounds [Soft] : abdomen soft [Post Cervical Nodes] : posterior cervical nodes [Not Distended] : not distended [Axillary Nodes] : axillary nodes [Anterior Cervical Nodes] : anterior cervical nodes [No Spinal Tenderness] : no spinal tenderness [Normal] : normal and non tender [Scoliosis] : scoliosis present [Spine Straight] : spine straight [No Stigmata of Cushings Syndrome] : no stigmata of cushings syndrome [Normal Strength/Tone] : muscle strength and tone were normal [No Rash] : no rash [Normal Gait] : normal gait [No Motor Deficits] : the motor exam was normal [Normal Reflexes] : deep tendon reflexes were 2+ and symmetric [No Tremors] : no tremors [Normal Affect] : the affect was normal [Oriented x3] : oriented to person, place, and time [Normal Mood] : the mood was normal

## 2019-10-18 NOTE — ASSESSMENT
[FreeTextEntry1] : 79 year-old female with PHPT. \par \par Pt remains asymptomatic; no h/o fx or kidney stones. Prior Ca was 12.1 recommended surgical consult, did not pursue. BMD 2017 showed decrease in hips.  Pt tried Actonel but stopped due to UGI sx. BMD 2018 decreased further in proximal radius. Sites still consistent with osteopenia. Pt states she was told by Dr. Howard Muñiz she does not need to start osteoporosis rx at this time. BMD 10/2019 indicates stable osteopenia in hip, proximal radius shows slightly worsened osteopenia.\par \par Option of PHPT surgery again reviewed. Pt planning on surgery after the holidays.\par 24 hr urine Ca 124. . Ca 11.4\par \par f/u in 6 months

## 2019-10-18 NOTE — PROCEDURE
[FreeTextEntry1] : Bone mineral density: 10/18/2019 \par Indication: vs. 2018 indication PHPT\par Spine: not performed\par Total hip: -2.1 osteopenia, no significant change \par Femoral neck: -2.3 osteopenia, no significant change \par Proximal radius: -2.0 osteopenia, -3.6%\par \par Bone mineral density 6/19/18\par indication: primary hyperparathyroidism \par spine not performed due to DJD \par total hip -1.9 osteopenia, no significant change \par femoral neck-2.2 osteopenia, no significant change \par proximal radius -1.7 osteopenia -5.7 %\par \par  Bone mineral density 5/26/17\par Indication primary hyperparathyroidism\par Spine not performed\par Total hip -2.1, osteopenia, --3.7%, a borderline statistical significance\par Femoral neck -2.3, osteopenia, -6.2%\par Proximal radius -1.1, normal, -11.8% versus 2006 seen but stable versus 2015\par \par Bone mineral density 5/16/16\par Indication primary hyperparathyroidism\par Spine not performed\par Total hip -1.8, osteopenia, -6.4% versus 2015\par Femoral neck -1.9, osteopenia, -4.5%\par Proximal radius 0.4, normal, +16.5%, no obvious source of error\par \par Repeat BMD- 05/11/2015\par indication primary hyperparathyroidism\par Spine falsely elevated due to arthritis\par Total Hip: -1.4, osteopenia, +3.1% versus 2014\par Fem Neck: -1.7, osteopenia, +8.5% versus 2014\par Prox Radius: -1.3, osteopenia, no significant change versus 2000 410\par \par bone mineral density test performed May 8, 2014\par Spine faulty elevated due to arthritis\par Total hip -1.6, osteopenia,\par Femoral neck -2.1, osteopenia\par Proximal radius -1.1, osteopenia

## 2019-10-18 NOTE — REVIEW OF SYSTEMS
[Joint Stiffness] : joint stiffness [Diarrhea] : diarrhea [Negative] : Heme/Lymph [FreeTextEntry5] : HLD, HTN [FreeTextEntry7] : GI evaluation for diarrhea has been unremarkable [FreeTextEntry9] : Arthritic pain typically in the morning in bilat knees and hands that resolves as the day progresses

## 2019-12-11 ENCOUNTER — APPOINTMENT (OUTPATIENT)
Dept: SURGICAL ONCOLOGY | Facility: CLINIC | Age: 79
End: 2019-12-11
Payer: MEDICARE

## 2019-12-11 VITALS
RESPIRATION RATE: 15 BRPM | BODY MASS INDEX: 32.78 KG/M2 | WEIGHT: 185 LBS | HEART RATE: 88 BPM | HEIGHT: 63 IN | SYSTOLIC BLOOD PRESSURE: 212 MMHG | DIASTOLIC BLOOD PRESSURE: 105 MMHG

## 2019-12-11 DIAGNOSIS — C44.529 SQUAMOUS CELL CARCINOMA OF SKIN OF OTHER PART OF TRUNK: ICD-10-CM

## 2019-12-11 PROCEDURE — 99214 OFFICE O/P EST MOD 30 MIN: CPT

## 2019-12-11 NOTE — OB HISTORY
[Menarche Age ____] : menarche age [unfilled] [Postmenopausal] : The patient is postmenopausal [Live Births ___] : P[unfilled]  [Total Preg ___] : G[unfilled] [unknown] : the patient is unsure of the date of her LMP

## 2019-12-23 NOTE — PHYSICAL EXAM
[Normal] : supple, no neck mass and thyroid not enlarged [Normal Neck Lymph Nodes] : normal neck lymph nodes  [Normal Groin Lymph Nodes] : normal groin lymph nodes [Normal Supraclavicular Lymph Nodes] : normal supraclavicular lymph nodes [Normal Axillary Lymph Nodes] : normal axillary lymph nodes [Normal] : oriented to person, place and time, with appropriate affect [de-identified] : Right buttock scar well healed. No other suspicious skin lesions.

## 2019-12-23 NOTE — ADDENDUM
[FreeTextEntry1] : I, Zofia Bass, acted solely as a scribe for Dr. Vu Fontana on this date 12/11/2019.

## 2019-12-23 NOTE — ASSESSMENT
[FreeTextEntry1] : S/p resection right buttock mass\par Final pathology revealed SCC\par Margins negative\par Normal physical examination \par Continue dermatologic surveillance with Dr. Richardson\par RTO 6 months

## 2019-12-23 NOTE — HISTORY OF PRESENT ILLNESS
[de-identified] : 80 y/o female patient presents for post-op follow-up s/p right lateral buttock mass resection on 9/9/19. Pathology: Squamous cell carcinoma, invasive, well differentiated. Tumor size: 4.5 x 3.9 x 2.9 cm (per gross description). Dermal fibrosis. The inked surgical margins appear clear of squamous cell carcinoma.\par \par She notes that she follows up with her dermatologist Dr. Jeremie Richardson.\par \par Right buttock culture: Moderate growth of Staphylococcus aureus (8/19/19).\par \par She had a right buttock abscess she has noticed since April.\par She reported that she has had a bump there on her right buttock for fifty years.\par She finished course of Doxycycline.\par \par Allergic to Bactrim.\par Denies constitutional symptoms. \par Denies fever or chills.

## 2020-04-28 ENCOUNTER — APPOINTMENT (OUTPATIENT)
Dept: ENDOCRINOLOGY | Facility: CLINIC | Age: 80
End: 2020-04-28

## 2020-09-09 ENCOUNTER — APPOINTMENT (OUTPATIENT)
Dept: SURGICAL ONCOLOGY | Facility: CLINIC | Age: 80
End: 2020-09-09

## 2024-04-30 NOTE — CONSULT LETTER
135 [Courtesy Letter:] : I had the pleasure of seeing your patient, [unfilled], in my office today. [Dear  ___] : Dear  [unfilled], [Sincerely,] : Sincerely, [Please see my note below.] : Please see my note below. [FreeTextEntry3] : Vu Fontana MD FACS\par

## 2025-04-30 NOTE — ASU PREOPERATIVE ASSESSMENT, ADULT ([facility] ONLY) - TEACHING/LEARNING CULTURAL CONSIDERATIONS
none
Quality 431: Preventive Care And Screening: Unhealthy Alcohol Use - Screening: Patient not identified as an unhealthy alcohol user when screened for unhealthy alcohol use using a systematic screening method
Quality 47: Advance Care Plan: Advance Care Planning discussed and documented in the medical record; patient did not wish or was not able to name a surrogate decision maker or provide an advance care plan.
Detail Level: Detailed